# Patient Record
Sex: MALE | Race: OTHER | Employment: FULL TIME | URBAN - METROPOLITAN AREA
[De-identification: names, ages, dates, MRNs, and addresses within clinical notes are randomized per-mention and may not be internally consistent; named-entity substitution may affect disease eponyms.]

---

## 2023-06-23 ENCOUNTER — HOSPITAL ENCOUNTER (EMERGENCY)
Facility: HOSPITAL | Age: 58
Discharge: HOME | End: 2023-06-23
Attending: STUDENT IN AN ORGANIZED HEALTH CARE EDUCATION/TRAINING PROGRAM
Payer: COMMERCIAL

## 2023-06-23 ENCOUNTER — APPOINTMENT (OUTPATIENT)
Facility: HOSPITAL | Age: 58
End: 2023-06-23
Payer: COMMERCIAL

## 2023-06-23 VITALS
SYSTOLIC BLOOD PRESSURE: 136 MMHG | HEART RATE: 65 BPM | DIASTOLIC BLOOD PRESSURE: 80 MMHG | RESPIRATION RATE: 17 BRPM | WEIGHT: 197.31 LBS | BODY MASS INDEX: 27.62 KG/M2 | TEMPERATURE: 98.4 F | OXYGEN SATURATION: 94 % | HEIGHT: 71 IN

## 2023-06-23 DIAGNOSIS — S80.01XA CONTUSION OF RIGHT KNEE, INITIAL ENCOUNTER: ICD-10-CM

## 2023-06-23 DIAGNOSIS — S40.012A CONTUSION OF MULTIPLE SITES OF LEFT SHOULDER AND UPPER ARM, INITIAL ENCOUNTER: Primary | ICD-10-CM

## 2023-06-23 DIAGNOSIS — S40.022A CONTUSION OF MULTIPLE SITES OF LEFT SHOULDER AND UPPER ARM, INITIAL ENCOUNTER: Primary | ICD-10-CM

## 2023-06-23 PROCEDURE — 99284 EMERGENCY DEPT VISIT MOD MDM: CPT

## 2023-06-23 PROCEDURE — 96372 THER/PROPH/DIAG INJ SC/IM: CPT

## 2023-06-23 PROCEDURE — 6360000002 HC RX W HCPCS: Performed by: STUDENT IN AN ORGANIZED HEALTH CARE EDUCATION/TRAINING PROGRAM

## 2023-06-23 PROCEDURE — 73562 X-RAY EXAM OF KNEE 3: CPT

## 2023-06-23 PROCEDURE — 73030 X-RAY EXAM OF SHOULDER: CPT

## 2023-06-23 RX ORDER — KETOROLAC TROMETHAMINE 10 MG/1
10 TABLET, FILM COATED ORAL EVERY 6 HOURS PRN
Qty: 12 TABLET | Refills: 0 | Status: SHIPPED | OUTPATIENT
Start: 2023-06-23 | End: 2023-06-26

## 2023-06-23 RX ORDER — KETOROLAC TROMETHAMINE 30 MG/ML
30 INJECTION, SOLUTION INTRAMUSCULAR; INTRAVENOUS
Status: COMPLETED | OUTPATIENT
Start: 2023-06-23 | End: 2023-06-23

## 2023-06-23 RX ADMIN — KETOROLAC TROMETHAMINE 30 MG: 30 INJECTION, SOLUTION INTRAMUSCULAR; INTRAVENOUS at 11:40

## 2023-06-23 ASSESSMENT — PAIN SCALES - GENERAL
PAINLEVEL_OUTOF10: 8
PAINLEVEL_OUTOF10: 8

## 2023-06-23 ASSESSMENT — PAIN DESCRIPTION - ORIENTATION: ORIENTATION: LEFT

## 2023-06-23 ASSESSMENT — PAIN DESCRIPTION - LOCATION: LOCATION: SHOULDER

## 2023-06-23 ASSESSMENT — PAIN - FUNCTIONAL ASSESSMENT: PAIN_FUNCTIONAL_ASSESSMENT: 0-10

## 2023-06-23 NOTE — ED PROVIDER NOTES
[facility] EMERGENCY DEP  EMERGENCY DEPARTMENT ENCOUNTER      Pt Name: He Quintana  MRN: [id number]  Lizzygfboone 1965  Date of evaluation: 6/23/2023  Provider: Gi Cai MD    CHIEF COMPLAINT       Chief Complaint   Patient presents with    Trauma    Shoulder Injury         HISTORY OF PRESENT ILLNESS   (Location/Symptom, Timing/Onset, Context/Setting, Quality, Duration, Modifying Factors, Severity)  Note limiting factors. Patient is a 68-year-old male present emergency department after he was cutting a tree branch treatments was approximately 10 feet above the patient when it fell striking him on the left shoulder and the right leg. Patient denies LOC denies any chest pain, shortness of breath patient states that the pain is worse with movement of his left arm at the shoulder. Patient is otherwise healthy patient is allergic to red meat. Review of External Medical Records:     Nursing Notes were reviewed. REVIEW OF SYSTEMS    (2-9 systems for level 4, 10 or more for level 5)     Review of Systems    Except as noted above the remainder of the review of systems was reviewed and negative. PAST MEDICAL HISTORY   History reviewed. No pertinent past medical history. SURGICAL HISTORY     History reviewed. No pertinent surgical history. CURRENT MEDICATIONS       Previous Medications    No medications on file       ALLERGIES     Patient has no known allergies. FAMILY HISTORY     History reviewed. No pertinent family history.        SOCIAL HISTORY       Social History     Socioeconomic History    Marital status:      Spouse name: None    Number of children: None    Years of education: None    Highest education level: None           PHYSICAL EXAM    (up to 7 for level 4, 8 or more for level 5)     ED Triage Vitals [06/23/23 1053]   BP Temp Temp Source Pulse Respirations SpO2 Height Weight - Scale   (!) 140/94 98.4 °F (36.9 °C) Oral 77 19 96 % 5' 11\" (1.803 m) 197 lb 5 oz

## 2023-06-23 NOTE — ED TRIAGE NOTES
Pt was cutting tree limbs and states an approx 7 ft long and 6\" in diameter tree limb fell approx 10 ft hitting pts left shoulder and knocking him to the ground. Pt denies LOC- did not strike head. Pt with decreased ROM of left shoulder and c/o pain to RLE. No deformity noted.